# Patient Record
Sex: FEMALE | Race: WHITE | NOT HISPANIC OR LATINO | Employment: OTHER | URBAN - METROPOLITAN AREA
[De-identification: names, ages, dates, MRNs, and addresses within clinical notes are randomized per-mention and may not be internally consistent; named-entity substitution may affect disease eponyms.]

---

## 2024-03-04 ENCOUNTER — TREATMENT (OUTPATIENT)
Dept: PHYSICAL THERAPY | Facility: CLINIC | Age: 69
End: 2024-03-04
Payer: MEDICARE

## 2024-03-04 DIAGNOSIS — M25.672 DECREASED RANGE OF MOTION OF LEFT ANKLE: ICD-10-CM

## 2024-03-04 DIAGNOSIS — R26.2 DIFFICULTY WALKING: ICD-10-CM

## 2024-03-04 DIAGNOSIS — M25.572 ACUTE LEFT ANKLE PAIN: Primary | ICD-10-CM

## 2024-03-04 PROCEDURE — 97530 THERAPEUTIC ACTIVITIES: CPT | Performed by: PHYSICAL THERAPIST

## 2024-03-04 PROCEDURE — 97110 THERAPEUTIC EXERCISES: CPT | Performed by: PHYSICAL THERAPIST

## 2024-03-04 NOTE — PROGRESS NOTES
"Physical Therapy Daily Treatment Note  [address] [facility]  [address]   [place] [address]      Patient: Inocencia Rogers   : 1955  Referring practitioner: CLARA Garcia*  Date of Initial Visit: Type: THERAPY  Noted: 2024  Today's Date: 3/4/2024  Patient seen for 3 sessions       Visit Diagnoses:    ICD-10-CM ICD-9-CM   1. Acute left ankle pain  M25.572 719.47   2. Decreased range of motion of left ankle  M25.672 719.57   3. Difficulty walking  R26.2 719.7       Subjective   Inocencia Rogers reports: ordered a cane to use for first thing in the morning when ankle is stiff. Recently returned from trip to [facility]. Had to take short walks, was somewhat painful. Quite a bit of pn walking through the airport on the way home, really \"flared things up\". Has improved over past couple of days. Incorporating HEP into yoga routine, 40 min workout in AM-working very slowly-feels good. Pn averages 4/10 w/ walking. Brace seems to cause inc pn/swelling at times, so will alternate between cane and brace.    Pre tx pn score: 4  Post tx pn score: 4    Treatment  See Exercise, Manual, and Modality Logs for complete treatment.     Objective     Active Range of Motion   Left Ankle/Foot   Dorsiflexion (ke): 10 degrees   Plantar flexion: 40 degrees   Inversion: 50 degrees   Eversion: 20 degrees      Pn w/ DF>PF>INV/EV; ant ankle, achilles/calf    Assessment & Plan       Assessment  Assessment details: Returns after absence while traveling out of the country. Reports inc ankle pn/swelling since return, contributes to prolonged standing/walking at the airport. Some regression w/ PF/DF ROM. Resumed gentle ROM exercises, light stretching, and seated calf activation. Tolerated all very well. Needed cues to avoid compensatory hip/knee rotation w/ seated inv/ev wipes. Issued updated HEP and counseled again on pn/edema mgmt.    Plan  Plan details: Cont w/ ROM, strengthening as tolerated          Timed:         Manual " Therapy:    0     mins  80119;     Therapeutic Exercise:    30     mins  21093;     Neuromuscular Erica:    0    mins  88111;    Therapeutic Activity:     8     mins  51685;     Gait Trainin     mins  85258;     Ultrasound:     0     mins  40796;    Ionto                               0    mins   49474  Self Care                       0     mins   89002  Canalith Repos    0     mins 42899      Un-Timed:  Electrical Stimulation:    0     mins  84527 ( );  Dry Needling     0     mins self-pay  Traction     0     mins 79957      Timed Treatment:   38   mins   Total Treatment:     38   mins    Naheed Herrera, PT  [place] License: #[id number]

## 2024-03-11 ENCOUNTER — TREATMENT (OUTPATIENT)
Dept: PHYSICAL THERAPY | Facility: CLINIC | Age: 69
End: 2024-03-11
Payer: MEDICARE

## 2024-03-11 DIAGNOSIS — M25.572 ACUTE LEFT ANKLE PAIN: Primary | ICD-10-CM

## 2024-03-11 DIAGNOSIS — M25.672 DECREASED RANGE OF MOTION OF LEFT ANKLE: ICD-10-CM

## 2024-03-11 DIAGNOSIS — R26.2 DIFFICULTY WALKING: ICD-10-CM

## 2024-03-11 NOTE — PROGRESS NOTES
Physical Therapy Daily Treatment Note  [address] [facility]  [address]   [place] [address]      Patient: Inocencia Ortiz   : 1955  Referring practitioner: CLARA Garcia*  Date of Initial Visit: Type: THERAPY  Noted: 2024  Today's Date: 3/11/2024  Patient seen for 4 sessions       Visit Diagnoses:    ICD-10-CM ICD-9-CM   1. Acute left ankle pain  M25.572 719.47   2. Decreased range of motion of left ankle  M25.672 719.57   3. Difficulty walking  R26.2 719.7       Subjective   Inocencia Ortiz reports: overall things have greatly improved. Feel stronger and better balanced, does not feel she needs the cane anymore. She is able to tolerate putting more weight on the ankle and swelling has gone down. Still doing yoga at home twice a day, but felt like she may have overdid it yesterday so backed off to just one yoga session and soaked the ankle in an epsom salt bath.    Pre tx pn score: 1-medial ankle bone  Post tx pn score: 1    Treatment  See Exercise, Manual, and Modality Logs for complete treatment.     Access Code: [email or web address]  URL: [email or web address]  Date: 2024  Prepared by: Naheed Herrera    Exercises  - Seated Heel Slide  - 2-3 x daily - 5-10 reps - 5 sec  hold  - Ankle Inversion Eversion Towel Slide  - 2-3 x daily - 5-10 reps  - Seated Calf Stretch with Strap  - 2-3 x daily - 3 reps - 15-20 sec hold  - Seated Toe Towel Scrunches  - 2-3 x daily - 15-30 reps  - Seated Figure 4 Ankle Inversion with Resistance  - 2-3 x daily - 15-20 reps  - Seated Ankle Eversion with Resistance  - 2-3 x daily - 15-20 reps  - Standing Heel Raise  - 2-3 x daily - 20 reps  - Standing Toe Raises at Chair  - 2-3 x daily - 20 reps    Objective         Assessment & Plan       Assessment  Assessment details: Pt verbalizes symptom improvement and demos increased WB tolerance and ankle motion grossly. Continue with LE strengthening, stabilization and balance exercises. Included standing balance for ankle  stability, demos increased difficulty maintaining balance L>R but performed w/o pn. Reviewed and updated HEP. Pt verbalized understanding.     Plan  Plan details: Continue with ankle strengthening and stabilization exercises. Progress as pt tolerates.           Timed:         Manual Therapy:    0     mins  12738;     Therapeutic Exercise:    25     mins  40766;     Neuromuscular Erica:    10    mins  46356;    Therapeutic Activity:     5     mins  68171;     Gait Trainin     mins  32405;     Ultrasound:     0     mins  72974;    Ionto                               0    mins   69595  Self Care                       0     mins   86566  Canalith Repos    0     mins 31755      Un-Timed:  Electrical Stimulation:    0     mins  91237 ( );  Dry Needling     0     mins self-pay  Traction     0     mins 37095      Timed Treatment:   40   mins   Total Treatment:     40   mins    Alla Bynum Physical Therapist Student completed treatment under my direct supervision.     2024      Naheed Herrera, PT  [place] License: #[id number]

## 2025-06-30 ENCOUNTER — HOSPITAL ENCOUNTER (EMERGENCY)
Facility: HOSPITAL | Age: 70
Discharge: HOME | End: 2025-06-30
Attending: EMERGENCY MEDICINE | Admitting: EMERGENCY MEDICINE
Payer: MEDICARE

## 2025-06-30 VITALS
HEART RATE: 68 BPM | WEIGHT: 150 LBS | OXYGEN SATURATION: 97 % | SYSTOLIC BLOOD PRESSURE: 170 MMHG | BODY MASS INDEX: 29.45 KG/M2 | RESPIRATION RATE: 16 BRPM | HEIGHT: 60 IN | TEMPERATURE: 98.5 F | DIASTOLIC BLOOD PRESSURE: 107 MMHG

## 2025-06-30 DIAGNOSIS — S52.501A CLOSED FRACTURE OF DISTAL END OF RIGHT RADIUS, UNSPECIFIED FRACTURE MORPHOLOGY, INITIAL ENCOUNTER: Primary | ICD-10-CM

## 2025-06-30 PROCEDURE — 99282 EMERGENCY DEPT VISIT SF MDM: CPT

## 2025-06-30 NOTE — ED PROVIDER NOTES
Subjective   History of Present Illness  69-year-old female sent to the emergency department from urgent care to be evaluated for a right distal radius fracture.  The patient tells me that yesterday morning at around 7 AM she tripped over a yoga mat and fell awkwardly onto an outstretched right hand.  She is right-handed.  She has had persistent pain in her right wrist since that time so she went to urgent care today and was then sent here for evaluation after plain films revealed a comminuted distal right radius fracture.  She denies any paresthesias.  She notes that the pain is not as severe today as it was yesterday.  She currently rates her pain at 5 out of 10 in severity.  She is currently in a sling.  She has no other complaints at this time.  Isolated injury.  She did not hit her head or lose consciousness.  No neck pain.  She is not anticoagulated.      Review of Systems   Musculoskeletal:         Right wrist pain   All other systems reviewed and are negative.      Past Medical History:   Diagnosis Date    Ankle sprain        No Known Allergies    No past surgical history on file.    Family History   Problem Relation Age of Onset    Osteoporosis Mother        Social History     Socioeconomic History    Marital status:    Tobacco Use    Smoking status: Never     Passive exposure: Past    Smokeless tobacco: Never   Vaping Use    Vaping status: Never Used   Substance and Sexual Activity    Alcohol use: Yes     Alcohol/week: 7.0 standard drinks of alcohol     Types: 7 Glasses of wine per week     Comment: A glass of red wine with dinner.    Drug use: Never    Sexual activity: Defer           Objective   Physical Exam  Vitals and nursing note reviewed.   Constitutional:       General: She is not in acute distress.     Appearance: She is well-developed. She is not diaphoretic.   HENT:      Head: Normocephalic and atraumatic.   Cardiovascular:      Rate and Rhythm: Normal rate and regular rhythm.      Heart  sounds: Normal heart sounds. No murmur heard.     No friction rub. No gallop.   Pulmonary:      Effort: Pulmonary effort is normal. No respiratory distress.      Breath sounds: Normal breath sounds. No wheezing or rales.   Musculoskeletal:      Comments: Mild soft tissue swelling noted to dorsal aspect of right wrist, no open component   Skin:     General: Skin is warm and dry.      Findings: No erythema or rash.   Neurological:      Mental Status: She is alert and oriented to person, place, and time.      Comments: Right upper extremity is neurovascularly intact distally with bounding distal pulses and normal sensation noted, normal  strength present   Psychiatric:         Mood and Affect: Mood normal.         Thought Content: Thought content normal.         Judgment: Judgment normal.         Splint - Cast - Strapping    Date/Time: 6/30/2025 4:58 PM    Performed by: Tyrell Phillips MD  Authorized by: Tyrell Phillips MD    Consent:     Consent obtained:  Verbal and written    Consent given by:  Patient    Risks, benefits, and alternatives were discussed: yes      Risks discussed:  Discoloration, numbness, swelling and pain  Universal protocol:     Procedure explained and questions answered to patient or proxy's satisfaction: yes      Relevant documents present and verified: yes      Imaging studies available: yes      Site/side marked: yes      Immediately prior to procedure a time out was called: yes      Patient identity confirmed:  Verbally with patient and arm band  Pre-procedure details:     Distal neurologic exam:  Normal    Distal perfusion: distal pulses strong and brisk capillary refill    Procedure details:     Location:  Wrist    Wrist location:  R wrist    Splint type:  Sugar tong    Supplies:  Cotton padding and fiberglass    Attestation: Splint applied and adjusted personally by me    Post-procedure details:     Distal neurologic exam:  Normal    Distal perfusion: distal pulses strong and  brisk capillary refill      Procedure completion:  Tolerated well, no immediate complications    Post-procedure imaging: not applicable               ED Course  ED Course as of 06/30/25 1656   Mon Jun 30, 2025   1626 69-year-old female sent to the emergency department from urgent care to be evaluated for right distal radius fracture.  The patient tells me that yesterday morning around 7 AM she tripped over a yoga mat and fell awkwardly onto an outstretched right hand.  She had persistent pain so she went to urgent care today and was sent here to be evaluated after plain films revealed a comminuted distal radius fracture.  On arrival to the ED, the patient is nontoxic-appearing.  Right upper extremity is neurovascularly intact distally with bounding distal pulses normal sensation noted.  Normal  strength noted.  There is no open component. [DD]   1627   I personally reviewed and interpreted the patient's x-ray images from earlier today which revealed a comminuted and mildly impacted [DD]   1627  close right distal radius fracture. [DD]   1627 Patient reassured.  Sugar-tong splint placed without complication.  Neurovascularly intact following splint placement.  She tolerated this well.  Sling given for comfort.  No indication for emergent reduction or sedation based on her x-ray findings at this time.  I have referred her to Dr. Gandara of orthopedics and she will follow-up within the next week.  Agreeable with plan and given appropriate strict return precautions [DD]      ED Course User Index  [DD] Tyrell Phillips MD                                           No results found for this or any previous visit (from the past 24 hours).  Note: In addition to lab results from this visit, the labs listed above may include labs taken at another facility or during a different encounter within the last 24 hours. Please correlate lab times with ED admission and discharge times for further clarification of the  "services performed during this visit.    No orders to display     Vitals:    06/30/25 1539 06/30/25 1650   BP: (!) 176/125 (!) 170/107   BP Location: Left arm Left arm   Patient Position: Sitting Sitting   Pulse: 66 68   Resp: 16    Temp: 98.5 °F (36.9 °C)    TempSrc: Oral    SpO2: 97% 97%   Weight: 68 kg (150 lb)    Height: 152.4 cm (60\")      Medications - No data to display  ECG/EMG Results (last 24 hours)       ** No results found for the last 24 hours. **          No orders to display                   Medical Decision Making  Problems Addressed:  Closed fracture of distal end of right radius, unspecified fracture morphology, initial encounter: acute illness or injury        Final diagnoses:   Closed fracture of distal end of right radius, unspecified fracture morphology, initial encounter       ED Disposition  ED Disposition       ED Disposition   Discharge    Condition   Stable    Comment   --               Gricel Gandara MD  [address] [address]  [phone]    In 1 week           Medication List      No changes were made to your prescriptions during this visit.            Tyerll Phillips MD  06/30/25 1659    "

## 2025-07-01 ENCOUNTER — TELEPHONE (OUTPATIENT)
Dept: ORTHOPEDIC SURGERY | Facility: CLINIC | Age: 70
End: 2025-07-01
Payer: MEDICARE

## 2025-07-03 ENCOUNTER — OFFICE VISIT (OUTPATIENT)
Dept: ORTHOPEDIC SURGERY | Facility: CLINIC | Age: 70
End: 2025-07-03
Payer: MEDICARE

## 2025-07-03 VITALS — BODY MASS INDEX: 29.43 KG/M2 | WEIGHT: 149.91 LBS | HEIGHT: 60 IN

## 2025-07-03 DIAGNOSIS — M25.531 RIGHT WRIST PAIN: ICD-10-CM

## 2025-07-03 DIAGNOSIS — S52.571D OTHER CLOSED INTRA-ARTICULAR FRACTURE OF DISTAL END OF RIGHT RADIUS WITH ROUTINE HEALING, SUBSEQUENT ENCOUNTER: Primary | ICD-10-CM

## 2025-07-03 PROBLEM — S52.501D CLOSED FRACTURE OF LOWER END OF RIGHT RADIUS WITH ROUTINE HEALING: Status: ACTIVE | Noted: 2025-07-03

## 2025-07-03 NOTE — PROGRESS NOTES
"                                                                 [facility]     Office Visit       Date: 07/03/2025   Patient Name: Inocencia Ortiz  MRN: [id number]  YOB: 1955    Referring Physician: No ref. provider found     Chief Complaint:   Chief Complaint   Patient presents with    Right Wrist - Pain     Fall 06/29/2025     History of Present Illness:   Inocencia Ortiz is a 69 y.o. female right-hand-dominant presented clinic as new patient with complaints of right wrist pain.  Patient reports that on 6/30/2025 she tripped over her yoga mat injuring her right wrist.  She went to the urgent care records were obtained.  She is placed in a splint and instructed follow-up.  She endorses pain and swelling throughout the hand wrist and digits.  Pain today is 3/10.  She denies numbness or tingling.  No other complaints or concerns.    Subjective   Review of Systems:   Review of Systems   Pertinent review of systems per HPI    I reviewed the patient's chief complaint, history of present illness, review of systems, past medical history, surgical history, family history, social history, medications and allergy list in the EMR on 07/03/2025 and agree with the findings above.    Objective    Vital Signs:   Vitals:    07/03/25 1104   Weight: 68 kg (149 lb 14.6 oz)   Height: 152.4 cm (60\")     General: No acute distress. Alert and oriented.   Cardiovascular: Palpable radial pulse.   Respiratory: Breathing is nonlabored.   Ortho Exam:  Examination right upper extremity demonstrates diffuse swelling and ecchymosis noted throughout the hand wrist and digits.  No open skin lesions or abrasions.  She is diffusely tender palpation about the dorsal and volar aspects of the distal radius and dorsal radiocarpal joint.  Range of motion of the wrist limited secondary to pain.  She is unable to make a composite fist.  Sensations intact light touch throughout the hand.  Well-perfused " distally.    Imaging / Studies:    Imaging Results (Last 24 Hours)       Procedure Component Value Units Date/Time    XR Wrist 3+ View Right [[id number]] Resulted: 07/03/25 1123     Updated: 07/03/25 1123    Narrative:      Right Wrist X-Ray    Indication: Pain    Views:  PA, Lateral, and Oblique     Comparison:  6/30/2025    Findings:  Redemonstration of intra-articular right distal radius fracture.  There is   approximately 8 to 10 degrees of dorsal angulation.  Articular incongruity   with central articular depression noted.            Assessment / Plan    Assessment/Plan:   Inocencia Ortiz is a 69 y.o. female with a right distal radius displaced intra-articular fracture, DOI 6/30/2025.    I discussed with the patient their clinical and radiographic findings demonstrate a distal radius fracture.  We had a lengthy discussion regarding the pathophysiology of the diagnosis of distal radius fractures.  Given the current fracture alignment, with intra articular extension with displacement, dorsal angulation and associated comminution surgical treatment in the form of open reduction internal fixation has been recommended to restore anatomic alignment.  Both conservative and surgical options were discussed. Conservative treatments in the form of: continued splinting/casting were presented. However, the risks of leaving the wrist in the current position, including angular deformity, chronic pain, function deficits, post-traumatic arthritis, and instability, were presented.  A CT scan will be ordered for preoperative planning purposes.  After expressing understanding of all options, the patient elects to proceed with right distal radius open reduction internal fixation.    The risks and benefits of the procedure were discussed with the patient and/or appropriate guardian, which include but are not limited to: the risk of bleeding, pain, infection, wound complications, neurovascular damage, post-operative stiffness,  tendon and/or ligament injury, persistent pain, need for additional surgeries in the future, and general risks from anesthesia. Distal radius ORIF: Specific risks include: nonunion, malunion, delayed union, damage to the palmar cutaneous nerve, wrist stiffness, decreased range of motion, need for future plate removal, flexor and/or extensor tendon irritation/rupture.  We also discussed the post-operative rehabilitation, length of immobilization, the need for therapy, and the overall expected outcomes from the procedure. Proper time was given to answer the patient's questions regarding the procedure. The patient expressed understanding. Knowing what the risks are and what the conservative treatment is, the patient elected to forgo any further conservative treatment options and proceed with the surgical intervention. Surgical consent was obtained in the clinic and signed by myself and the patient. They will follow up with me postoperatively.       ICD-10-CM ICD-9-CM   1. Other closed intra-articular fracture of distal end of right radius with routine healing, subsequent encounter  S52.571D V54.12   2. Right wrist pain  M25.531 719.43     Follow Up:   Return for Follow Up- After surgery.      Gricel Gandara MD  [facility] [place] [facility]opedic & Hand Surgeon

## 2025-07-03 NOTE — H&P (VIEW-ONLY)
"                                                                 [facility]     Office Visit       Date: 07/03/2025   Patient Name: Inocencia Ortiz  MRN: [id number]  YOB: 1955    Referring Physician: No ref. provider found     Chief Complaint:   Chief Complaint   Patient presents with    Right Wrist - Pain     Fall 06/29/2025     History of Present Illness:   Inocencia Ortiz is a 69 y.o. female right-hand-dominant presented clinic as new patient with complaints of right wrist pain.  Patient reports that on 6/30/2025 she tripped over her yoga mat injuring her right wrist.  She went to the urgent care records were obtained.  She is placed in a splint and instructed follow-up.  She endorses pain and swelling throughout the hand wrist and digits.  Pain today is 3/10.  She denies numbness or tingling.  No other complaints or concerns.    Subjective   Review of Systems:   Review of Systems   Pertinent review of systems per HPI    I reviewed the patient's chief complaint, history of present illness, review of systems, past medical history, surgical history, family history, social history, medications and allergy list in the EMR on 07/03/2025 and agree with the findings above.    Objective    Vital Signs:   Vitals:    07/03/25 1104   Weight: 68 kg (149 lb 14.6 oz)   Height: 152.4 cm (60\")     General: No acute distress. Alert and oriented.   Cardiovascular: Palpable radial pulse.   Respiratory: Breathing is nonlabored.   Ortho Exam:  Examination right upper extremity demonstrates diffuse swelling and ecchymosis noted throughout the hand wrist and digits.  No open skin lesions or abrasions.  She is diffusely tender palpation about the dorsal and volar aspects of the distal radius and dorsal radiocarpal joint.  Range of motion of the wrist limited secondary to pain.  She is unable to make a composite fist.  Sensations intact light touch throughout the hand.  Well-perfused " distally.    Imaging / Studies:    Imaging Results (Last 24 Hours)       Procedure Component Value Units Date/Time    XR Wrist 3+ View Right [[id number]] Resulted: 07/03/25 1123     Updated: 07/03/25 1123    Narrative:      Right Wrist X-Ray    Indication: Pain    Views:  PA, Lateral, and Oblique     Comparison:  6/30/2025    Findings:  Redemonstration of intra-articular right distal radius fracture.  There is   approximately 8 to 10 degrees of dorsal angulation.  Articular incongruity   with central articular depression noted.            Assessment / Plan    Assessment/Plan:   Inocencia Ortiz is a 69 y.o. female with a right distal radius displaced intra-articular fracture, DOI 6/30/2025.    I discussed with the patient their clinical and radiographic findings demonstrate a distal radius fracture.  We had a lengthy discussion regarding the pathophysiology of the diagnosis of distal radius fractures.  Given the current fracture alignment, with intra articular extension with displacement, dorsal angulation and associated comminution surgical treatment in the form of open reduction internal fixation has been recommended to restore anatomic alignment.  Both conservative and surgical options were discussed. Conservative treatments in the form of: continued splinting/casting were presented. However, the risks of leaving the wrist in the current position, including angular deformity, chronic pain, function deficits, post-traumatic arthritis, and instability, were presented.  A CT scan will be ordered for preoperative planning purposes.  After expressing understanding of all options, the patient elects to proceed with right distal radius open reduction internal fixation.    The risks and benefits of the procedure were discussed with the patient and/or appropriate guardian, which include but are not limited to: the risk of bleeding, pain, infection, wound complications, neurovascular damage, post-operative stiffness,  tendon and/or ligament injury, persistent pain, need for additional surgeries in the future, and general risks from anesthesia. Distal radius ORIF: Specific risks include: nonunion, malunion, delayed union, damage to the palmar cutaneous nerve, wrist stiffness, decreased range of motion, need for future plate removal, flexor and/or extensor tendon irritation/rupture.  We also discussed the post-operative rehabilitation, length of immobilization, the need for therapy, and the overall expected outcomes from the procedure. Proper time was given to answer the patient's questions regarding the procedure. The patient expressed understanding. Knowing what the risks are and what the conservative treatment is, the patient elected to forgo any further conservative treatment options and proceed with the surgical intervention. Surgical consent was obtained in the clinic and signed by myself and the patient. They will follow up with me postoperatively.       ICD-10-CM ICD-9-CM   1. Other closed intra-articular fracture of distal end of right radius with routine healing, subsequent encounter  S52.571D V54.12   2. Right wrist pain  M25.531 719.43     Follow Up:   Return for Follow Up- After surgery.      Gricel Gandara MD  [facility] Orthopedic & Hand Surgeon

## 2025-07-04 ENCOUNTER — HOSPITAL ENCOUNTER (OUTPATIENT)
Dept: CT IMAGING | Facility: HOSPITAL | Age: 70
Discharge: HOME | End: 2025-07-04
Payer: MEDICARE

## 2025-07-04 DIAGNOSIS — S52.571D OTHER CLOSED INTRA-ARTICULAR FRACTURE OF DISTAL END OF RIGHT RADIUS WITH ROUTINE HEALING, SUBSEQUENT ENCOUNTER: ICD-10-CM

## 2025-07-04 PROCEDURE — 73200 CT UPPER EXTREMITY W/O DYE: CPT

## 2025-07-10 DIAGNOSIS — S52.571D OTHER CLOSED INTRA-ARTICULAR FRACTURE OF DISTAL END OF RIGHT RADIUS WITH ROUTINE HEALING, SUBSEQUENT ENCOUNTER: Primary | ICD-10-CM

## 2025-07-11 ENCOUNTER — ANESTHESIA (OUTPATIENT)
Dept: PERIOP | Facility: HOSPITAL | Age: 70
End: 2025-07-11
Payer: MEDICARE

## 2025-07-11 ENCOUNTER — ANESTHESIA EVENT (OUTPATIENT)
Dept: PERIOP | Facility: HOSPITAL | Age: 70
End: 2025-07-11
Payer: MEDICARE

## 2025-07-11 ENCOUNTER — ANESTHESIA EVENT CONVERTED (OUTPATIENT)
Dept: ANESTHESIOLOGY | Facility: HOSPITAL | Age: 70
End: 2025-07-11
Payer: MEDICARE

## 2025-07-11 ENCOUNTER — HOSPITAL ENCOUNTER (OUTPATIENT)
Facility: HOSPITAL | Age: 70
Setting detail: SURGERY ADMIT
Discharge: HOME | End: 2025-07-11
Attending: STUDENT IN AN ORGANIZED HEALTH CARE EDUCATION/TRAINING PROGRAM | Admitting: STUDENT IN AN ORGANIZED HEALTH CARE EDUCATION/TRAINING PROGRAM
Payer: MEDICARE

## 2025-07-11 VITALS
WEIGHT: 149 LBS | RESPIRATION RATE: 15 BRPM | DIASTOLIC BLOOD PRESSURE: 93 MMHG | HEIGHT: 60 IN | TEMPERATURE: 98.2 F | HEART RATE: 76 BPM | SYSTOLIC BLOOD PRESSURE: 177 MMHG | OXYGEN SATURATION: 97 % | BODY MASS INDEX: 29.25 KG/M2

## 2025-07-11 DIAGNOSIS — S52.571D OTHER CLOSED INTRA-ARTICULAR FRACTURE OF DISTAL END OF RIGHT RADIUS WITH ROUTINE HEALING, SUBSEQUENT ENCOUNTER: Primary | ICD-10-CM

## 2025-07-11 PROCEDURE — 25010000002 DEXAMETHASONE SODIUM PHOSPHATE 10 MG/ML SOLUTION: Performed by: NURSE ANESTHETIST, CERTIFIED REGISTERED

## 2025-07-11 PROCEDURE — C1713 ANCHOR/SCREW BN/BN,TIS/BN: HCPCS | Performed by: STUDENT IN AN ORGANIZED HEALTH CARE EDUCATION/TRAINING PROGRAM

## 2025-07-11 PROCEDURE — 25010000002 PROPOFOL 10 MG/ML EMULSION: Performed by: NURSE ANESTHETIST, CERTIFIED REGISTERED

## 2025-07-11 PROCEDURE — 25010000002 BUPIVACAINE (PF) 0.5 % SOLUTION: Performed by: NURSE ANESTHETIST, CERTIFIED REGISTERED

## 2025-07-11 PROCEDURE — 25010000002 FENTANYL CITRATE (PF) 50 MCG/ML SOLUTION: Performed by: NURSE ANESTHETIST, CERTIFIED REGISTERED

## 2025-07-11 PROCEDURE — C1755 CATHETER, INTRASPINAL: HCPCS | Performed by: STUDENT IN AN ORGANIZED HEALTH CARE EDUCATION/TRAINING PROGRAM

## 2025-07-11 PROCEDURE — 25810000003 LACTATED RINGERS PER 1000 ML: Performed by: NURSE ANESTHETIST, CERTIFIED REGISTERED

## 2025-07-11 PROCEDURE — 25609 OPTX DST RD XART FX/EP SEP3+: CPT | Performed by: STUDENT IN AN ORGANIZED HEALTH CARE EDUCATION/TRAINING PROGRAM

## 2025-07-11 PROCEDURE — 25010000002 CEFAZOLIN PER 500 MG: Performed by: STUDENT IN AN ORGANIZED HEALTH CARE EDUCATION/TRAINING PROGRAM

## 2025-07-11 DEVICE — PLT GEMINUS NRW 3H RT: Type: IMPLANTABLE DEVICE | Site: WRIST | Status: FUNCTIONAL

## 2025-07-11 DEVICE — PEG VOLR GEMINUS LK HI COMPR TI 2.7X18MM: Type: IMPLANTABLE DEVICE | Site: WRIST | Status: FUNCTIONAL

## 2025-07-11 DEVICE — IMPLANTABLE DEVICE: Type: IMPLANTABLE DEVICE | Site: WRIST | Status: FUNCTIONAL

## 2025-07-11 DEVICE — PEG GEMINUS THRD LK TI 2.3X16MM: Type: IMPLANTABLE DEVICE | Site: WRIST | Status: FUNCTIONAL

## 2025-07-11 DEVICE — SCRW GEMINUS PA NL TI 3.5X11MM: Type: IMPLANTABLE DEVICE | Site: WRIST | Status: FUNCTIONAL

## 2025-07-11 DEVICE — SCRW GEMINUS PA NL TI 3.5X12MM: Type: IMPLANTABLE DEVICE | Site: WRIST | Status: FUNCTIONAL

## 2025-07-11 RX ORDER — MIDAZOLAM HYDROCHLORIDE 1 MG/ML
0.5 INJECTION, SOLUTION INTRAMUSCULAR; INTRAVENOUS
Status: DISCONTINUED | OUTPATIENT
Start: 2025-07-11 | End: 2025-07-11 | Stop reason: HOSPADM

## 2025-07-11 RX ORDER — SODIUM CHLORIDE 0.9 % (FLUSH) 0.9 %
10 SYRINGE (ML) INJECTION AS NEEDED
Status: DISCONTINUED | OUTPATIENT
Start: 2025-07-11 | End: 2025-07-11 | Stop reason: HOSPADM

## 2025-07-11 RX ORDER — DEXAMETHASONE SODIUM PHOSPHATE 10 MG/ML
INJECTION, SOLUTION INTRAMUSCULAR; INTRAVENOUS
Status: COMPLETED | OUTPATIENT
Start: 2025-07-11 | End: 2025-07-11

## 2025-07-11 RX ORDER — PROMETHAZINE HYDROCHLORIDE 25 MG/1
25 TABLET ORAL ONCE AS NEEDED
Status: DISCONTINUED | OUTPATIENT
Start: 2025-07-11 | End: 2025-07-11 | Stop reason: HOSPADM

## 2025-07-11 RX ORDER — PROMETHAZINE HYDROCHLORIDE 25 MG/1
25 SUPPOSITORY RECTAL ONCE AS NEEDED
Status: DISCONTINUED | OUTPATIENT
Start: 2025-07-11 | End: 2025-07-11 | Stop reason: HOSPADM

## 2025-07-11 RX ORDER — FENTANYL CITRATE 50 UG/ML
50 INJECTION, SOLUTION INTRAMUSCULAR; INTRAVENOUS
Status: DISCONTINUED | OUTPATIENT
Start: 2025-07-11 | End: 2025-07-11 | Stop reason: HOSPADM

## 2025-07-11 RX ORDER — SODIUM CHLORIDE, SODIUM LACTATE, POTASSIUM CHLORIDE, CALCIUM CHLORIDE 600; 310; 30; 20 MG/100ML; MG/100ML; MG/100ML; MG/100ML
9 INJECTION, SOLUTION INTRAVENOUS CONTINUOUS
Status: DISCONTINUED | OUTPATIENT
Start: 2025-07-12 | End: 2025-07-11 | Stop reason: HOSPADM

## 2025-07-11 RX ORDER — HYDROMORPHONE HYDROCHLORIDE 1 MG/ML
0.5 INJECTION, SOLUTION INTRAMUSCULAR; INTRAVENOUS; SUBCUTANEOUS
Status: DISCONTINUED | OUTPATIENT
Start: 2025-07-11 | End: 2025-07-11 | Stop reason: SDUPTHER

## 2025-07-11 RX ORDER — SODIUM CHLORIDE 0.9 % (FLUSH) 0.9 %
3-10 SYRINGE (ML) INJECTION AS NEEDED
Status: DISCONTINUED | OUTPATIENT
Start: 2025-07-11 | End: 2025-07-11 | Stop reason: HOSPADM

## 2025-07-11 RX ORDER — PROPOFOL 10 MG/ML
VIAL (ML) INTRAVENOUS CONTINUOUS PRN
Status: DISCONTINUED | OUTPATIENT
Start: 2025-07-11 | End: 2025-07-11 | Stop reason: SURG

## 2025-07-11 RX ORDER — IPRATROPIUM BROMIDE AND ALBUTEROL SULFATE 2.5; .5 MG/3ML; MG/3ML
3 SOLUTION RESPIRATORY (INHALATION) ONCE AS NEEDED
Status: DISCONTINUED | OUTPATIENT
Start: 2025-07-11 | End: 2025-07-11 | Stop reason: HOSPADM

## 2025-07-11 RX ORDER — SODIUM CHLORIDE, SODIUM LACTATE, POTASSIUM CHLORIDE, CALCIUM CHLORIDE 600; 310; 30; 20 MG/100ML; MG/100ML; MG/100ML; MG/100ML
9 INJECTION, SOLUTION INTRAVENOUS CONTINUOUS
Status: DISCONTINUED | OUTPATIENT
Start: 2025-07-11 | End: 2025-07-11 | Stop reason: HOSPADM

## 2025-07-11 RX ORDER — NALOXONE HCL 0.4 MG/ML
0.4 VIAL (ML) INJECTION AS NEEDED
Status: DISCONTINUED | OUTPATIENT
Start: 2025-07-11 | End: 2025-07-11 | Stop reason: HOSPADM

## 2025-07-11 RX ORDER — MAGNESIUM HYDROXIDE 1200 MG/15ML
LIQUID ORAL AS NEEDED
Status: DISCONTINUED | OUTPATIENT
Start: 2025-07-11 | End: 2025-07-11 | Stop reason: HOSPADM

## 2025-07-11 RX ORDER — FENTANYL CITRATE 50 UG/ML
INJECTION, SOLUTION INTRAMUSCULAR; INTRAVENOUS
Status: COMPLETED | OUTPATIENT
Start: 2025-07-11 | End: 2025-07-11

## 2025-07-11 RX ORDER — SODIUM CHLORIDE 0.9 % (FLUSH) 0.9 %
3 SYRINGE (ML) INJECTION EVERY 12 HOURS SCHEDULED
Status: DISCONTINUED | OUTPATIENT
Start: 2025-07-11 | End: 2025-07-11 | Stop reason: HOSPADM

## 2025-07-11 RX ORDER — DROPERIDOL 2.5 MG/ML
0.62 INJECTION, SOLUTION INTRAMUSCULAR; INTRAVENOUS
Status: DISCONTINUED | OUTPATIENT
Start: 2025-07-11 | End: 2025-07-11 | Stop reason: HOSPADM

## 2025-07-11 RX ORDER — BUPIVACAINE HYDROCHLORIDE 5 MG/ML
INJECTION, SOLUTION EPIDURAL; INTRACAUDAL; PERINEURAL
Status: COMPLETED | OUTPATIENT
Start: 2025-07-11 | End: 2025-07-11

## 2025-07-11 RX ORDER — FENTANYL CITRATE 50 UG/ML
50 INJECTION, SOLUTION INTRAMUSCULAR; INTRAVENOUS
Status: DISCONTINUED | OUTPATIENT
Start: 2025-07-11 | End: 2025-07-11 | Stop reason: SDUPTHER

## 2025-07-11 RX ORDER — ONDANSETRON 2 MG/ML
4 INJECTION INTRAMUSCULAR; INTRAVENOUS ONCE AS NEEDED
Status: DISCONTINUED | OUTPATIENT
Start: 2025-07-11 | End: 2025-07-11 | Stop reason: SDUPTHER

## 2025-07-11 RX ORDER — FAMOTIDINE 20 MG/1
20 TABLET, FILM COATED ORAL ONCE
Status: COMPLETED | OUTPATIENT
Start: 2025-07-11 | End: 2025-07-11

## 2025-07-11 RX ORDER — SODIUM CHLORIDE 9 MG/ML
9 INJECTION, SOLUTION INTRAVENOUS AS NEEDED
Status: DISCONTINUED | OUTPATIENT
Start: 2025-07-11 | End: 2025-07-11 | Stop reason: HOSPADM

## 2025-07-11 RX ORDER — OXYCODONE HYDROCHLORIDE 5 MG/1
5 TABLET ORAL EVERY 8 HOURS PRN
Qty: 21 TABLET | Refills: 0 | Status: SHIPPED | OUTPATIENT
Start: 2025-07-11

## 2025-07-11 RX ORDER — HYDROCODONE BITARTRATE AND ACETAMINOPHEN 7.5; 325 MG/1; MG/1
1 TABLET ORAL EVERY 4 HOURS PRN
Status: DISCONTINUED | OUTPATIENT
Start: 2025-07-11 | End: 2025-07-11 | Stop reason: HOSPADM

## 2025-07-11 RX ORDER — HYDROMORPHONE HYDROCHLORIDE 1 MG/ML
0.5 INJECTION, SOLUTION INTRAMUSCULAR; INTRAVENOUS; SUBCUTANEOUS
Status: DISCONTINUED | OUTPATIENT
Start: 2025-07-11 | End: 2025-07-11 | Stop reason: HOSPADM

## 2025-07-11 RX ORDER — FAMOTIDINE 10 MG/ML
20 INJECTION, SOLUTION INTRAVENOUS ONCE
Status: DISCONTINUED | OUTPATIENT
Start: 2025-07-11 | End: 2025-07-11 | Stop reason: HOSPADM

## 2025-07-11 RX ORDER — HYDROCODONE BITARTRATE AND ACETAMINOPHEN 5; 325 MG/1; MG/1
1 TABLET ORAL ONCE AS NEEDED
Status: DISCONTINUED | OUTPATIENT
Start: 2025-07-11 | End: 2025-07-11 | Stop reason: HOSPADM

## 2025-07-11 RX ORDER — LABETALOL HYDROCHLORIDE 5 MG/ML
5 INJECTION, SOLUTION INTRAVENOUS
Status: DISCONTINUED | OUTPATIENT
Start: 2025-07-11 | End: 2025-07-11 | Stop reason: HOSPADM

## 2025-07-11 RX ORDER — HYDRALAZINE HYDROCHLORIDE 20 MG/ML
5 INJECTION INTRAMUSCULAR; INTRAVENOUS
Status: DISCONTINUED | OUTPATIENT
Start: 2025-07-11 | End: 2025-07-11 | Stop reason: HOSPADM

## 2025-07-11 RX ORDER — DROPERIDOL 2.5 MG/ML
0.62 INJECTION, SOLUTION INTRAMUSCULAR; INTRAVENOUS ONCE AS NEEDED
Status: DISCONTINUED | OUTPATIENT
Start: 2025-07-11 | End: 2025-07-11 | Stop reason: HOSPADM

## 2025-07-11 RX ORDER — LIDOCAINE HYDROCHLORIDE 10 MG/ML
0.5 INJECTION, SOLUTION EPIDURAL; INFILTRATION; INTRACAUDAL; PERINEURAL ONCE AS NEEDED
Status: DISCONTINUED | OUTPATIENT
Start: 2025-07-11 | End: 2025-07-11 | Stop reason: HOSPADM

## 2025-07-11 RX ORDER — ONDANSETRON 2 MG/ML
4 INJECTION INTRAMUSCULAR; INTRAVENOUS ONCE AS NEEDED
Status: DISCONTINUED | OUTPATIENT
Start: 2025-07-11 | End: 2025-07-11 | Stop reason: HOSPADM

## 2025-07-11 RX ORDER — SODIUM CHLORIDE 0.9 % (FLUSH) 0.9 %
10 SYRINGE (ML) INJECTION EVERY 12 HOURS SCHEDULED
Status: DISCONTINUED | OUTPATIENT
Start: 2025-07-11 | End: 2025-07-11 | Stop reason: HOSPADM

## 2025-07-11 RX ADMIN — DEXAMETHASONE SODIUM PHOSPHATE 2 MG: 10 INJECTION INTRAMUSCULAR; INTRAVENOUS at 11:03

## 2025-07-11 RX ADMIN — PROPOFOL 100 MCG/KG/MIN: 10 INJECTION, EMULSION INTRAVENOUS at 11:40

## 2025-07-11 RX ADMIN — SODIUM CHLORIDE, POTASSIUM CHLORIDE, SODIUM LACTATE AND CALCIUM CHLORIDE: 600; 310; 30; 20 INJECTION, SOLUTION INTRAVENOUS at 12:49

## 2025-07-11 RX ADMIN — FENTANYL CITRATE 100 MCG: 50 INJECTION, SOLUTION INTRAMUSCULAR; INTRAVENOUS at 11:03

## 2025-07-11 RX ADMIN — SODIUM CHLORIDE 2000 MG: 900 INJECTION INTRAVENOUS at 11:35

## 2025-07-11 RX ADMIN — BUPIVACAINE HYDROCHLORIDE 30 ML: 5 INJECTION, SOLUTION EPIDURAL; INTRACAUDAL; PERINEURAL at 11:03

## 2025-07-11 RX ADMIN — FAMOTIDINE 20 MG: 20 TABLET, FILM COATED ORAL at 10:52

## 2025-07-11 NOTE — ANESTHESIA PREPROCEDURE EVALUATION
Anesthesia Evaluation                  Airway   Mallampati: I  TM distance: >3 FB  Neck ROM: full  No difficulty expected  Dental      Pulmonary    Cardiovascular         Neuro/Psych  GI/Hepatic/Renal/Endo    (+) obesity    Musculoskeletal     Abdominal    Substance History      OB/GYN          Other                    Anesthesia Plan    ASA 2     general     (Right infraclav marked)  intravenous induction     Anesthetic plan, risks, benefits, and alternatives have been provided, discussed and informed consent has been obtained with: patient.    Plan discussed with CRNA.    CODE STATUS:

## 2025-07-11 NOTE — DISCHARGE INSTRUCTIONS
PATIENT INSTRUCTIONS FOLLOWING HAND SURGERY (splint)    1. ACTIVITY - Keep your hand elevated above your heart as much as possible for the first 24-48 hours following surgery. This will significantly reduce swelling and discomfort. Elevation is more important than icing. If icing, do not place ice directly on skin. Ice for maximum of 15 minutes on and 15 minutes off. Move your unaffected fingers frequently throughout the day to avoid stiffness. Light use of your hand and fingers is encouraged. Avoid any forceful or repetitive gripping, or heavy lifting. Drive with care, reacting quickly will be somewhat impaired by the bandage and post-operative discomfort. The determination of whether you can safely drive with one extremity splinted is the decision of a licensed .    2. WOUND CARE - Keep your splint clean and dry. Do not remove. You can shower/bathe. If you shower, the splint must stay covered and dry. If the splint becomes wet, please call the office. The splint will be removed for you at your first post-operative visit.    3. MEDICATIONS - Please resume taking your usual medications. For pain you may alternate with over-the-counter Tylenol (325mg or 500mg) and Motrin (400mg) if you can tolerate them. You may be prescribed a short course of oral narcotics (Oxycodone or Tramadol) should you need them. If you are taking a prescribed pain medication, you should not drive, operate machinery, power tools, or drink any alcoholic beverages.    4. ANESTHESIA - Due to the lasting effects of anesthesia, we recommend you do not make any important decisions for 24 hours.    5. FOLLOW-UP - You should already have a follow-up appointment scheduled with Dr. Gandara in 1-2 weeks. If there are any questions or problems, please call the office at [phone] ([facility] Office) or [phone] ([facility] Office).    IF YOU HAVE ANY OF THE FOLLOWING, CONTACT THE OFFICE:  · Fever of 101° or above  · Redness, warmth in the  hand or arm that doesn’t improve with ice and elevation  · Foul smelling drainage from bandage/splint  · Pain that worsens despite pain medication  · Persistent nausea or vomiting into the next day after surgery  · Bleeding or continuous oozing that saturates the bandage and does not stop after applying firm pressure to wound for 10 minutes  · Increased swelling of fingers or severe tightness of bandage not relieved  · Increased numbness and tingling in the hand/fingers    COMMON FINDINGS:  · Bruising into the hand/ fingers and sometimes even into the arm is normal  · Swelling is going to be present for at least the next 2-3 weeks    In case of an emergency in which you cannot reach your physician, please go directly to the nearest hospital emergency room department.

## 2025-07-11 NOTE — ANESTHESIA PROCEDURE NOTES
Infraclavicular nerve block      Patient reassessed immediately prior to procedure    Patient location during procedure: pre-op  Stop time: 7/11/2025 11:03 AM  Reason for block: at surgeon's request and post-op pain management  Performed by  CRNA/CAA: Jed Dixon, CRNA  Assisted by: Kalpana Heller RN  Preanesthetic Checklist  Completed: patient identified, IV checked, site marked, risks and benefits discussed, surgical consent, monitors and equipment checked, pre-op evaluation and timeout performed  Prep:  Pt Position: supine  Sterile barriers:cap, gloves, mask and washed/disinfected hands  Prep: ChloraPrep  Patient monitoring: blood pressure monitoring, continuous pulse oximetry and EKG  Procedure    Sedation: yes  Performed under: local infiltration  Guidance:ultrasound guided    ULTRASOUND INTERPRETATION.  Using ultrasound guidance a 20 G gauge needle was placed in close proximity to the brachial plexus nerve, at which point, under ultrasound guidance anesthetic was injected in the area of the nerve and spread of the anesthesia was seen on ultrasound in close proximity thereto.  There were no abnormalities seen on ultrasound; a digital image was taken; and the patient tolerated the procedure with no complications. Images:still images obtained, printed/placed on chart    Laterality:right  Block Type:infraclavicular  Injection Technique:single-shot  Needle Type:echogenic and short-bevel  Needle Gauge:20 G  Resistance on Injection: none    Medications Used: fentaNYL citrate (PF) (SUBLIMAZE) injection - Intravenous   100 mcg - 7/11/2025 11:03:00 AM  bupivacaine (PF) (MARCAINE) 0.5 % injection - Injection   30 mL - 7/11/2025 11:03:00 AM  dexamethasone sodium phosphate injection - Injection   2 mg - 7/11/2025 11:03:00 AM      Post Assessment  Injection Assessment: negative aspiration for heme, no paresthesia on injection and incremental injection  Patient Tolerance:comfortable throughout  "block  Complications:no  Additional Notes  SINGLE shot   The affected upper extremity was abducted and rotated 90 degrees at the shoulder, within the patients range of motion. A high-frequency linear transducer, with sterile cover, was placed just medial to the coracoid process, distal third of the clavicle, and inferior to the clavicle. Keeping the transducer is in a parasagittal plane (cephalad to caudad), scanning medially to laterally. The Pectoralis major and minor, brachial plexus, axillary artery and vein, rib and pleura where visualized. The insertion site was prepped and draped in sterile fashion. Skin and cutaneous tissue was infiltrated with 2-5 ml of 1% Lidocaine. Using ultrasound-guidance, a 20-gauge B-Cortez 4\" Ultraplex 360 non-stimulating echogenic needle was then inserted and advanced in-plane lateral to the axillary artery and lateral cord of the brachial plexus. Preservative-free normal saline was utilized for hydro-dissection of tissue, advancement of needle, and to confirm final needle placement posterior to the axillary artery and posterior cord of the brachial plexus. Local anesthetic injection spread, in incremental 3-5 ml injections, was confirmed. Aspiration every 5 ml to prevent intravascular injection. Injection was completed with negative aspiration of blood and negative intravascular injection. Injection pressures were normal with minimal resistance.  Performed by: Jed Dixon, ANDREW            "

## 2025-07-11 NOTE — ANESTHESIA POSTPROCEDURE EVALUATION
Patient: Inocencia Ortiz    Procedure Summary       Date: 07/11/25 Room / Location:  [facility] /  [facility]    Anesthesia Start: 1135 Anesthesia Stop: 1256    Procedure: OPEN REDUCTION INTERNAL FIXATION DISTAL RADIUS (Right: Wrist) Diagnosis:       Other closed intra-articular fracture of distal end of right radius with routine healing, subsequent encounter      (Other closed intra-articular fracture of distal end of right radius with routine healing, subsequent encounter [M74.321I])    Surgeons: Gricel Gandara MD Provider: Tyrell Mathur MD    Anesthesia Type: general ASA Status: 2            Anesthesia Type: general    Vitals  Vitals Value Taken Time   BP     Temp     Pulse 72 07/11/25 12:56   Resp     SpO2 94 % 07/11/25 12:56   Vitals shown include unfiled device data.        Post Anesthesia Care and Evaluation    Patient location during evaluation: PACU  Patient participation: complete - patient participated  Level of consciousness: awake and alert  Pain management: adequate    Airway patency: patent  Anesthetic complications: No anesthetic complications  PONV Status: none  Cardiovascular status: hemodynamically stable and acceptable  Respiratory status: nonlabored ventilation, acceptable and nasal cannula  Hydration status: acceptable

## 2025-07-11 NOTE — OP NOTE
ORTHOPEDIC OPERATIVE REPORT    PATIENT NAME: Inocencia Ortiz    MRN: [id number]    YOB: 1955    DATE OF SURGERY: 07/11/25     LOCATION: [facility] Main OR    PREOPERATIVE DIAGNOSIS:   Right distal radius fracture     POSTOPERATIVE DIAGNOSIS:  Right distal radius fracture    PROCEDURE PERFORMED:  Right distal radius open reduction internal fixation    CPT:  31243    SURGEON: Gricel Gandara MD     ASSISTANT: None     ANESTHESIA: Monitored anesthesia with regional block    SPECIMENS: None    TOURNIQUET TIME: 39 minutes    ESTIMATED BLOOD LOSS: Minimal    COMPLICATIONS: None    IMPLANTS: Skeletal Dynamic Gemnius Plate    INDICATIONS FOR PROCEDURE:  Inocencia Ortiz is a 69-year-old right-hand-dominant female who presented to clinic complaining of right wrist pain after sustaining injury to the right upper extremity after tripping over her yoga mat, DOI 6/30/2025. Radiographs were obtained demonstrating a right intra-articular distal radius fracture with articular depression and dorsal angulation. I dicussed with the patient that indications for operative treatment including articular displacement and dorsal angulation. I also discussed non operative treatment in the form of a cast would increase the risk of malunion, wrist arthritis, stiffness and continued pain/deformity. No promises or guarantees were made with operative treatment. They expressed understanding of their options.  After explaining the risk, benefits, alternatives and prognosis, they elected to proceed with right distal radius open reduction internal fixation.     DESCRIPTION OF PROCEDURE:   The patient was identified in the preoperative holding area utilizing two patient identifiers.The operative extremity was marked. A preoperative block was performed by the anesthesia team. They were taken back to the operating room and placed supine on the operative table.  A upper arm tourniquet was placed and the operative  extremity was prepped and draped in a standard sterile fashion. A surgical time out was performed that confirmed the correct site, procedure and laterality. We verified that the patient had received IV antibiotics and exsanguinated the right upper extremity using an Esmarch bandage and inflated tourniquet to 250 mmHg.      A standard FCR approach to the distal radius was marked out. A 15 blade scalpel was used to incise the skin, care was taken to achieve hemostasis at the wound edges. The FCR tendon was identified and the subsheath incised.  The FPL was swept ulnarly to expose the pronator quadratus.  A self-retaining retractor was placed and the pronator quadratus was taken off the volar surface of the distal radius, leaving a cuff of tissue radially for repair at the end of the case. Using Littler scissors, the brachioradialis was identified radially.  The first dorsal compartment was also identified and protected with a [place].  The brachioradialis was released off of its insertion at the radial styloid.    We exposed and identified our fracture which appeared to be intraarticular containing 3 parts.  The articular surface that was noted to be depressed on the preoperative scan was elevated through the fracture site with a [place].  The dorsal angulation was reduced and the reduction was confirmed visually and with mini c arm. An appropriately sized [employer] distal radius plate was provisionally applied to the distal radius with two k-wires distally through the aiming guides radially and ulnarly. The position was confirmed in the PA and lateral views and minor adjustments to the plate were made.  We then drilled and placed the screw in the oblong hole.  We then began to drill and fill the most distal screws starting ulnarly and working radially. the provisional k-wires were removed and replaced with appropriately sized screws. The remaining screw proximally were then placed.  Final xray images were  obtained in the PA, 20 degree lateral, and DRUJ view. No screws were found to be intraarticular.  The wrist was also ranged under C arm imaging and this verified that the fracture was stable.  The wound was then thoroughly irrigated with normal saline.  We verified that our drill guides had all been removed from the plate.  Tourniquet was let down and hemostasis was achieved with the bipolar electrocautery.  The pronator quadratus was repaired with 2-0 Vicryl.  We then closed the subcutaneous layer and the skin was run using 4-0 Monocryl.  Sterile dressings were applied to the incision and then the patient was placed in a short arm volar splint in neutral wrist dorsiflexion.  All counts were correct at the end the case.  The patient was awoken from anesthesia and transferred to the PACU in good and stable condition.    POSTOPERATIVE PLAN:  1. Non weight bearing right upper extremity. Do not remove the splint.   2. Oxycodone 5mg for pain control. A prescription was provided. Over the counter Tylenol and Ibuprofen may also be used.   3. Digit range of motion as allowed by the splint is encouraged.   4. Follow up: 10-14 days     Gricel Gandara MD  [facility]

## 2025-07-11 NOTE — INTERVAL H&P NOTE
"[facility] Pre-op    Full history and physical note from office is attached.    VS: /91  HR 72  RR 16  T 97.9  Sat 99%RA    Review of Systems:  Constitutional-- No fever, chills or sweats. No fatigue.  CV-- No chest pain, palpitation or syncope  Resp-- No SOB, cough, hemoptysis  Skin--Right arm splint CDI    Physical Exam:  Heart:   Regular rate and rhythm, S1 and S2 normal  Lungs: Clear to auscultation bilaterally, respirations unlabored    LAB Results:  No results found for: \"WBC\", \"HGB\", \"HCT\", \"MCV\", \"PLT\", \"NEUTROABS\", \"GLUCOSE\", \"BUN\", \"CREATININE\", \"EGFRIFNONA\", \"EGFRIFAFRI\", \"NA\", \"K\", \"CL\", \"CO2\", \"MG\", \"PHOS\", \"CALCIUM\", \"ALBUMIN\", \"AST\", \"ALT\", \"BILITOT\", \"PTT\", \"INR\"    Study Result    Narrative & Impression   CT WRIST RIGHT WO CONTRAST     Date of Exam: 7/4/2025 7:16 AM EDT     Indication: Preoperative planning.     Comparison: Wrist radiographs 7/3/2025     Technique: Axial CT images were obtained of the right wrist without contrast administration.  Reconstructed coronal and sagittal images were also obtained. Automated exposure control and iterative construction methods were used.        Findings:  Bones and joints: Comminuted minimally displaced fracture of the distal radius with intra-articular extension. There is articular surface defect of approximately 0.3 x 0.7 cm. There is minimal distal dorsal tilt. Old ulnar styloid fracture. No additional   fracture seen. Mild degenerative changes of the first carpometacarpal joint.     Soft tissues: Soft tissue swelling predominant along the volar aspect of the wrist. Intrinsic muscles and tendons appear intact. No soft tissue mass or fluid collection.     IMPRESSION:  Impression:  Comminuted minimally displaced fracture of the distal radius with intra-articular extension. There is articular surface defect of approximately 0.3 x 0.7 cm. There is minimal distal dorsal tilt.     Cancer Staging (if applicable)  Cancer Patient: __ yes __no " __unknown__N/A; If yes, clinical stage T:__ N:__M:__, stage group or __N/A      Impression: Closed fracture of lower end of right radius with routine healing       Plan: OPEN REDUCTION INTERNAL FIXATION DISTAL RADIUS       JUNE Rivas   7/11/2025   10:33 EDT

## 2025-07-11 NOTE — NURSING NOTE
Caregiver Telephone Number    Phone Number for Ride/Caregiver: NO CONTACT     Who will be staying with you post procedure for the next 24 hours? Name and Phone Number?: NO CONTACT

## 2025-07-24 ENCOUNTER — OFFICE VISIT (OUTPATIENT)
Dept: ORTHOPEDIC SURGERY | Facility: CLINIC | Age: 70
End: 2025-07-24
Payer: MEDICARE

## 2025-07-24 VITALS — TEMPERATURE: 97.3 F

## 2025-07-24 DIAGNOSIS — S52.571D OTHER CLOSED INTRA-ARTICULAR FRACTURE OF DISTAL END OF RIGHT RADIUS WITH ROUTINE HEALING, SUBSEQUENT ENCOUNTER: Primary | ICD-10-CM

## 2025-07-24 NOTE — PROGRESS NOTES
[facility]     Post Operative Office Visit      Date: 07/24/2025   Patient Name: Inocencia Ortiz  MRN: [id number]  YOB: 1955    Chief Complaint:   Chief Complaint   Patient presents with    Post-op     2 weeks s/p OPEN REDUCTION INTERNAL FIXATION DISTAL RADIUS dos 7/11/25       History of Present Illness:   Inocencia Ortiz is a 69 y.o. female status post right distal radius open reduction internal fixation, DOS 7/11/2025.  Patient has done well postoperatively.  She reports for over 10 pain currently.  She has been working on making a composite fist.  No other complaints or concerns.    Subjective   Review of Systems:   Review of Systems   Constitutional:  Negative for chills, fever, unexpected weight gain and unexpected weight loss.   HENT:  Negative for congestion, postnasal drip and rhinorrhea.    Eyes:  Negative for blurred vision.   Respiratory:  Negative for shortness of breath.    Cardiovascular:  Negative for leg swelling.   Gastrointestinal:  Negative for abdominal pain, nausea and vomiting.   Genitourinary:  Negative for difficulty urinating.   Musculoskeletal:  Positive for arthralgias. Negative for gait problem, joint swelling and myalgias.   Skin:  Negative for skin lesions and wound.   Neurological:  Negative for dizziness, weakness, light-headedness and numbness.   Hematological:  Does not bruise/bleed easily.   Psychiatric/Behavioral:  Negative for depressed mood.         I reviewed the patient's chief complaint, history of present illness, review of systems, past medical history, surgical history, family history, social history, medications and allergy list in the EMR on 07/24/2025 and agree with the findings above.    Objective    Vital Signs:   Vitals:    07/24/25 1050   Temp: 97.3 °F (36.3 °C)       General Appearance: No acute distress. Alert and oriented.     Ortho Exam:  Examination of the  right Upper Extremity:   Skin examination: He has wrist incision overlying the volar aspect of the distal radius.  This is nontender to palpation.  No erythema warmth or drainage.  Nontender diffusely throughout the wrist.  Able to make a composite fist, painless short arc wrist range of motion  AIN/PIN/Radial/Ulnar nerves grossly motor intact  Median/radial/ulnar sensory intact to light touch   Palpable radial pulse, digits are warm and well-perfused       Imaging / Studies:    Imaging Results (Last 24 Hours)       Procedure Component Value Units Date/Time    XR Wrist 3+ View Right [[id number]] Resulted: 07/24/25 1057     Updated: 07/24/25 1057    Narrative:      Right Wrist X-Ray    Indication: s/p ORIF    Views:  AP, Lateral, and Oblique     Comparison: Intra op     Findings:  Patient is status post ORIF of the distal radius. The fracture appears to   be healing appropriately. The hardware is intact.  Normal soft tissues.   Normal joint spaces. Alignment appears acceptable.  Suboptimal PA and   lateral views.    Impression:  Fracture of the right distal radius s/p ORIF with routine   healing.             Assessment / Plan    Assessment/Plan:   Inocencia Ortiz is a 69 y.o. female status post right distal radius open reduction internal fixation, DOS 7/11/2025.    Patient has done well postoperatively.  Suture tails were trimmed.  She was provided a removable wrist brace to be worn with activity.  I advised against any heavy push pull or lifting with the right upper extremity.  Will defer a formal course of hand therapy and she elected to perform home directed exercises.  I will see her back in 4 weeks with repeat x-rays and reevaluation of her motion.  All questions and concerns were addressed.  She is agreeable.      ICD-10-CM ICD-9-CM   1. Other closed intra-articular fracture of distal end of right radius with routine healing, subsequent encounter  S52.571D V54.12     Follow Up:   Return in about 4 weeks  (around 8/21/2025) for Follow Up- with repeat xrays.      Gricel Gandara MD  [facility]

## 2025-08-21 ENCOUNTER — OFFICE VISIT (OUTPATIENT)
Dept: ORTHOPEDIC SURGERY | Facility: CLINIC | Age: 70
End: 2025-08-21
Payer: MEDICARE

## 2025-08-21 DIAGNOSIS — Z98.890 S/P ORIF (OPEN REDUCTION INTERNAL FIXATION) FRACTURE: Primary | ICD-10-CM

## 2025-08-21 DIAGNOSIS — Z87.81 S/P ORIF (OPEN REDUCTION INTERNAL FIXATION) FRACTURE: Primary | ICD-10-CM

## (undated) DEVICE — GOWN,SIRUS,NONRNF,SETINSLV,XL,20/CS: Brand: MEDLINE

## (undated) DEVICE — BNDG ELAS CO-FLEX SLF ADHR 4IN5YD LF STRL

## (undated) DEVICE — DRP C/ARM MINI

## (undated) DEVICE — GUIDE AIMING 1.5MM

## (undated) DEVICE — GLV SURG SENSICARE PI ORTHO SZ6.5 LF STRL

## (undated) DEVICE — GLV SURG DERMASSURE GRN LF PF 7.0

## (undated) DEVICE — STRIP,CLOSURE,WOUND,MEDI-STRIP,1/2X4: Brand: MEDLINE

## (undated) DEVICE — DISPOSABLE TOURNIQUET CUFF SINGLE BLADDER, DUAL PORT AND QUICK CONNECT CONNECTOR: Brand: COLOR CUFF

## (undated) DEVICE — BNDG,ELSTC,MATRIX,STRL,4"X5YD,LF,HOOK&LP: Brand: MEDLINE

## (undated) DEVICE — BIT DRL SLD SD CUT 2.5X40MM

## (undated) DEVICE — DRVR AO CONNECT SQ TP 2MM

## (undated) DEVICE — PREMIUM DRY TRAY LF: Brand: MEDLINE INDUSTRIES, INC.

## (undated) DEVICE — KWIRE STD TP 1.6X127MM
Type: IMPLANTABLE DEVICE | Site: WRIST | Status: NON-FUNCTIONAL
Removed: 2025-07-11

## (undated) DEVICE — SUT ETHLN 4/0 PS2 18IN 1667H

## (undated) DEVICE — SUT MNCRYL PLS ANTIB UD 4/0 PS2 18IN

## (undated) DEVICE — DRVR QC UNIV T10

## (undated) DEVICE — ANTIBACTERIAL UNDYED BRAIDED (POLYGLACTIN 910), SYNTHETIC ABSORBABLE SUTURE: Brand: COATED VICRYL

## (undated) DEVICE — Device

## (undated) DEVICE — UNDERCAST PADDING: Brand: DEROYAL

## (undated) DEVICE — BLANKT WARM LOWR/BDY 100X120CM

## (undated) DEVICE — BIT DRL SLD SD CUT 2.0X40MM

## (undated) DEVICE — PK EXTREM UPPR 10

## (undated) DEVICE — DISPOSABLE BIPOLAR FORCEPS 4" (10.2CM) JEWELERS, STRAIGHT 0.4MM TIP AND 12 FT. (3.6M) CABLE: Brand: KIRWAN

## (undated) DEVICE — SCRB SURG BACTOSHIELD CHG 4PCT 4OZ